# Patient Record
Sex: FEMALE
[De-identification: names, ages, dates, MRNs, and addresses within clinical notes are randomized per-mention and may not be internally consistent; named-entity substitution may affect disease eponyms.]

---

## 2022-09-22 PROBLEM — Z00.00 ENCOUNTER FOR PREVENTIVE HEALTH EXAMINATION: Status: ACTIVE | Noted: 2022-09-22

## 2022-09-27 ENCOUNTER — APPOINTMENT (OUTPATIENT)
Dept: ORTHOPEDIC SURGERY | Facility: CLINIC | Age: 42
End: 2022-09-27

## 2022-09-27 VITALS
SYSTOLIC BLOOD PRESSURE: 112 MMHG | HEIGHT: 64 IN | OXYGEN SATURATION: 98 % | DIASTOLIC BLOOD PRESSURE: 72 MMHG | HEART RATE: 68 BPM | WEIGHT: 202 LBS | BODY MASS INDEX: 34.49 KG/M2

## 2022-09-27 DIAGNOSIS — M22.2X1 PATELLOFEMORAL DISORDERS, RIGHT KNEE: ICD-10-CM

## 2022-09-27 DIAGNOSIS — M22.2X2 PATELLOFEMORAL DISORDERS, RIGHT KNEE: ICD-10-CM

## 2022-09-27 PROCEDURE — 99204 OFFICE O/P NEW MOD 45 MIN: CPT

## 2022-09-27 PROCEDURE — 73564 X-RAY EXAM KNEE 4 OR MORE: CPT | Mod: RT

## 2022-09-27 NOTE — PHYSICAL EXAM
[Slightly Antalgic] : slightly antalgic [de-identified] : .knee\par \par Knee exam\par Constitutional: Well-nourished, well-developed, No acute distress\par Respiratory:  Good respiratory effort, no SOB\par Lymphatic: No regional lymphadenopathy, no lymphedema\par Psychiatric: Pleasant and normal affect, alert and oriented x3\par Skin: Clean dry and intact B/L UE/LE\par Musculoskeletal: normal except where as noted in regional exam\par \par \par BL Knee:\par APPEARANCE: no marked deformities, no swelling or malalignment\par POSITIVE TENDERNESS:  medial and lateral joint line\par NONTENDER: b/l & retinacula b/l, patellar & quadriceps tendons, MCL/LCL, ITB at the lateral femoral condyle & Gerdy's tubercle, pes bursa. \par ROM: full with pain. \par RESISTIVE TESTING: painless resisted knee flex/ext. \par SPECIAL TESTS: stable v/v stress. painless grind. neg Lachman's. neg ant/post drawer. pos Yissel's. neg Thessaly test. neg Nevaeh's & Malacrae's\par NEURO: Normal sensation of LE, DTRs 2+/4 patella and achilles\par PULSES: 2+ DP/PT pulses\par B/L Hips: No asymmetry, malalignment, or swelling, Full ROM, 5/5 strength in flexion/ext, IR/ER, Abd/Add, Joints stable\par B/L Ankles: No asymmetry, malalignment, or swelling, Full ROM, 5/5 strength in DF/PF/Inv/Ev, Joints stable\par BIOMECHANICAL EXAM: no marked leg length discrepancy, [default value]hip abductor weakness b/l, no marked foot pronation, tight hams and ITB b/l.  \par \par \par \par \par  [de-identified] : \par The following radiographs were ordered and read by me during this patient's visit. I reviewed each radiograph in detail with the patient and discussed the findings as highlighted below. \par \par 4 views of the bilateral knees were obtained today that show no fracture, or dislocation. There are patellofemoral compartment degenerative changes seen. Lateral patellar tilt

## 2022-09-27 NOTE — HISTORY OF PRESENT ILLNESS
[de-identified] : ZEFERINO is a 41 year old F who presents with Bl knee pain.  Pain is primarily located at the anterior BL knees.  It began 3-4 months ago without injury or trauma.  Pain is described as sharp and constant  in nature, 8/10 in intensity, worse with activity, better with rest.  \par Pos swelling\par Denies locking/instability\par Patient was dean at an Urgent care center where she had XR done \par Medications: nsaids with temporary relief\par Reports no injury/injections/surgery to the knee\par Reports trying home exercises with no relief\par Denies bowel/bladder changes, fevers, chills, saddle anesthesia.  Denies numbness, tingling, weakness of the lower extremities.\par

## 2022-09-27 NOTE — DISCUSSION/SUMMARY
[de-identified] : Discussed findings of today's exam and possible causes of patient's pain.  Educated patient on their most probable diagnosis of patellofemoral pain and gluteus medius weakness.  Reviewed possible courses of treatment, and we collaboratively decided best course of treatment at this time will include:\par 1. referral to PT to improve gluteal strength\par 2. may trial patellar strap vs sleeve \par 3. activity as tolerated\par 4. trial of NSAIDs for pain as needed\par \par Follow up in 6 weeks.\par \par Molly Carreno MD, EdM\par Sports Medicine [facility]\par

## 2022-09-27 NOTE — ADDENDUM
[FreeTextEntry1] : I Gabriel Winston ATC, assisted in the history and documentation of the patient with Dr Molly Carreno on September 27th 2022.